# Patient Record
Sex: FEMALE | URBAN - METROPOLITAN AREA
[De-identification: names, ages, dates, MRNs, and addresses within clinical notes are randomized per-mention and may not be internally consistent; named-entity substitution may affect disease eponyms.]

---

## 2022-10-20 ENCOUNTER — MEDICAL CORRESPONDENCE (OUTPATIENT)
Dept: HEALTH INFORMATION MANAGEMENT | Facility: CLINIC | Age: 52
End: 2022-10-20

## 2022-11-02 ENCOUNTER — TRANSCRIBE ORDERS (OUTPATIENT)
Dept: OTHER | Age: 52
End: 2022-11-02

## 2022-11-02 ENCOUNTER — PRE VISIT (OUTPATIENT)
Dept: ONCOLOGY | Facility: CLINIC | Age: 52
End: 2022-11-02

## 2022-11-02 DIAGNOSIS — D25.1 INTRAMURAL LEIOMYOMA OF UTERUS: Primary | ICD-10-CM

## 2022-11-02 NOTE — PROGRESS NOTES
RECORDS STATUS - ALL OTHER DIAGNOSIS      Dr Enoc Lux, [facility]  T [phone]  RECORDS RECEIVED FROM: [facility]/ [facility]   DATE RECEIVED: TBD    Action    Action Taken 11/2/2022 12:14PM KEB     I called [facility] Phone: [phone]- I faxed records from the [facility] to [facility]     I called 's IMG Dept to see if they have access to [facility] images. I spoke to Emily- I sent a formal request for imaging to  Fax: [phone]      NOTES STATUS DETAILS   OFFICE NOTE from referring provider Complete Referral recd by fax from Dr Enoc Lux,   OFFICE NOTE from medical oncologist Complete  [facility] Records are being uploaded into Epic by HIM 11/2/2022 12:16PM KEB   DISCHARGE SUMMARY from hospital     DISCHARGE REPORT from the ER     OPERATIVE REPORT     CLINICAL TRIAL TREATMENTS TO DATE     LABS     PATHOLOGY REPORTS     ANYTHING RELATED TO DIAGNOSIS Complete Labs last updated on 11/1/2022   GENONOMIC TESTING     TYPE:     IMAGING (NEED IMAGES & REPORT)     CT SCANS     MRI     MAMMO     ULTRASOUND Requested- [facility] 6/14/2022   PET

## 2022-11-03 ENCOUNTER — PATIENT OUTREACH (OUTPATIENT)
Dept: ONCOLOGY | Facility: CLINIC | Age: 52
End: 2022-11-03

## 2022-11-03 NOTE — PROGRESS NOTES
"New Patient Hematology / Oncology Nurse Navigator Note     Referral Date: 11/2/22    Referring provider:   Enoc Lux MD    [address] [place] [id number]    Phone: [phone]    Fax: [phone]      Referring Clinic/Organization: [facility]      Referred to: GynOnc    Requested provider (if applicable): Dr. Greenwood    Evaluation for : Intramural leiomyoma of uterus       Clinical History (per Nurse review of records provided):      10/20/22 Office Visit OBGYN:  \"We referred her to Gyn/Oncology to discuss surgical options including robotic hysterectomy with or without BSO but she did not follow-up due to insurance reasons and new diagnosis of renal failure. We were able to find an approved Gyn/Oncologist (Dr. Greenwood). I am concerned she could have significant pelvic adhesions due her long-standing Crohn's disease, and her medical complexity.\" -- BOOKMARKED    2/12/22 MRI Pelvis showing:  IMPRESSION:   1.  Heterogeneity throughout the uterine body and fundus that appears to be centered at the endometrium. This would be most consistent with diffuse adenomyosis.   2.  Normal cervix.   3.  Small peripheral uterine fibroids.   4.  Normal ovaries.    Clinical Assessment / Barriers to Care (Per Nurse):  Pt lives in [facility]      Records Location: Care Everywhere     Records Needed:   Images from     Additional testing needed prior to consult:   N/A    Referral updates and Plan:   OUTGOING CALL to pt, no answer.     Left message with family member introducing my role as nurse navigator with [facility] and that we have recd the referral for surgical consult from Dr Lux. Provided number for scheduling and my name if further questions on referral.     Explained to pt that he/she will receive a call from our scheduling intake team and provided our call-back number below if needed.      KELLY SmithN, RN, PHN, OCN  Hematology/Oncology Nurse " [facility]  [phone]

## 2022-11-08 NOTE — PROGRESS NOTES
NPS contacted patient to arrange appointment. Patient stating she is unable to come to [place] for either virtual or in-person visit with Dr. Greenwood.     Writer attempted to reach patient x2 to discuss further. Left detailed message with call back info requesting call back to discuss.     Addendum: Was able to reach patient 11/10. Patient reports she does not have transportation to appointment and is unable to cross state line for video visit. She is hoping for a telephone consult to start so that the GynOnc team can review her record and let her know if she is even a surgical candidate/what she would need to do to become a surgical candidate down the road given her immunosuppression, HAM etc. Will reach out to  re: possible transportation assistance options. [place] would be the closest location to her home. Will review with Dr. Jarrett/RNCC pending input from  team.        Angelita Riggins, BSN, RN, PHN, OCN  Hematology/Oncology Nurse Navigator  [facility]  [phone]

## 2022-11-15 ENCOUNTER — PATIENT OUTREACH (OUTPATIENT)
Dept: CARE COORDINATION | Facility: CLINIC | Age: 52
End: 2022-11-15

## 2022-11-15 NOTE — PROGRESS NOTES
Social Work Intervention  [facility]    Data/Intervention:    Patient Name:  Irene Bains  /Age:  1970 (52 year old)    Visit Type: telephone  Referral Source: [facility]  Reason for Referral:  Transportation    Collaborated With:    -Patient    Psychosocial Information/Concerns:  Patient is needing schedule a surgery consult with GynOnc, but lives in [place], [place] and has transportation concerns. Unable to do virtual/video visit.     Intervention/Education/Resources Provided:  SW called patient, introduced self and explained the reason for call. Per patient they drive locally, but did not feel comfortable driving into the cities. Patient is also not able to do a virtual visit due to living outside of the state. Patient does have MA, but does not find them to be reliable as they are not able to inform them of a confirmed ride until the night before. Per patient they do have someone that might be able to provide a ride to and from, similar to a ride share gem, but does have concerns with finances.     Assessment/Plan:  Patient to check in with the person who might be able to assist with transportation and SW to check in with care team. SW will continue to remain available as needed. Provided patient/family with contact information and availability.    Update:22  Per conversation with MALIK Goldstein they will look into patient having a virtual visit with Dr. Jarrett who is also licensed in [place]. They will work on scheduling a virtual visit for the consult and schedule surgery at [place] if needed. SW will continue to remain available as needed.    KHADRA Mcghee,[facility]  Hematology/Oncology Social Worker  Phone:[phone] Pager: [phone]

## 2022-11-17 NOTE — PROGRESS NOTES
Upon further discussion with SW/GynOnc RNCCs will recommend video visit with Dr. Jarrett for surgery consult as patient's preferred location is [facility] and Dr. Jarrett is licensed in [place] and so can accommodate video visit to start. Patient aware she will need to be seen in-person prior to scheduling any surgery. Patient will confirm with her insurance if Dr. Jarrett is in network. Provided her with general contact number for [facility] as she is not able to locate on her insurance card. Provided call back number if any further questions arise. Transferred to NPS to arrange video visit with Dr. Jarrett as discussed.